# Patient Record
Sex: MALE | Race: WHITE | NOT HISPANIC OR LATINO | Employment: FULL TIME | URBAN - METROPOLITAN AREA
[De-identification: names, ages, dates, MRNs, and addresses within clinical notes are randomized per-mention and may not be internally consistent; named-entity substitution may affect disease eponyms.]

---

## 2023-08-30 PROBLEM — I27.20 PULMONARY HYPERTENSION (MULTI): Status: ACTIVE | Noted: 2023-08-30

## 2023-08-30 PROBLEM — E78.00 HYPERCHOLESTEROLEMIA: Status: ACTIVE | Noted: 2023-08-30

## 2023-08-30 PROBLEM — I25.10 CORONARY ARTERY DISEASE INVOLVING NATIVE CORONARY ARTERY OF NATIVE HEART WITHOUT ANGINA PECTORIS: Status: ACTIVE | Noted: 2023-08-30

## 2023-08-30 PROBLEM — Z95.5 PRESENCE OF STENT IN CORONARY ARTERY: Status: ACTIVE | Noted: 2023-08-30

## 2023-08-30 PROBLEM — I10 BENIGN ESSENTIAL HYPERTENSION: Status: ACTIVE | Noted: 2023-08-30

## 2023-08-30 PROBLEM — I25.2 OLD MI (MYOCARDIAL INFARCTION): Status: ACTIVE | Noted: 2023-08-30

## 2023-08-30 RX ORDER — ATORVASTATIN CALCIUM 80 MG/1
1 TABLET, FILM COATED ORAL NIGHTLY
COMMUNITY
Start: 2018-11-30 | End: 2023-10-06 | Stop reason: SDUPTHER

## 2023-08-30 RX ORDER — CARVEDILOL 6.25 MG/1
1 TABLET ORAL 2 TIMES DAILY
COMMUNITY
Start: 2018-11-16 | End: 2023-10-06 | Stop reason: SDUPTHER

## 2023-08-30 RX ORDER — NITROGLYCERIN 0.4 MG/1
0.4 TABLET SUBLINGUAL EVERY 5 MIN PRN
COMMUNITY

## 2023-08-30 RX ORDER — LOSARTAN POTASSIUM 100 MG/1
100 TABLET ORAL DAILY
COMMUNITY
End: 2023-10-06 | Stop reason: SDUPTHER

## 2023-08-30 RX ORDER — LOSARTAN POTASSIUM 50 MG/1
1 TABLET ORAL DAILY
COMMUNITY
Start: 2021-09-21 | End: 2023-10-06 | Stop reason: DRUGHIGH

## 2023-08-30 RX ORDER — CLOPIDOGREL BISULFATE 75 MG/1
1 TABLET ORAL DAILY
COMMUNITY
Start: 2018-08-24 | End: 2023-10-06 | Stop reason: SDUPTHER

## 2023-10-05 NOTE — ASSESSMENT & PLAN NOTE
[facility] (07/17-07/20/2018): Presented inferior ST elevation MI. Aspiration thrombectomy & YANELI circumflex artery. Significant thrombus burden. Slow flow 2nd obtuse marginal artery.

## 2023-10-06 ENCOUNTER — OFFICE VISIT (OUTPATIENT)
Dept: CARDIOLOGY | Facility: CLINIC | Age: 63
End: 2023-10-06
Payer: COMMERCIAL

## 2023-10-06 VITALS
HEART RATE: 55 BPM | BODY MASS INDEX: 28.7 KG/M2 | OXYGEN SATURATION: 95 % | SYSTOLIC BLOOD PRESSURE: 148 MMHG | RESPIRATION RATE: 16 BRPM | WEIGHT: 200 LBS | DIASTOLIC BLOOD PRESSURE: 88 MMHG

## 2023-10-06 DIAGNOSIS — E78.00 HYPERCHOLESTEROLEMIA: ICD-10-CM

## 2023-10-06 DIAGNOSIS — I25.10 CORONARY ARTERY DISEASE INVOLVING NATIVE CORONARY ARTERY OF NATIVE HEART WITHOUT ANGINA PECTORIS: ICD-10-CM

## 2023-10-06 DIAGNOSIS — I10 BENIGN ESSENTIAL HYPERTENSION: Primary | ICD-10-CM

## 2023-10-06 DIAGNOSIS — I10 BENIGN ESSENTIAL HYPERTENSION: ICD-10-CM

## 2023-10-06 DIAGNOSIS — Z95.5 PRESENCE OF STENT IN CORONARY ARTERY: ICD-10-CM

## 2023-10-06 DIAGNOSIS — I25.2 OLD MI (MYOCARDIAL INFARCTION): ICD-10-CM

## 2023-10-06 PROCEDURE — 3077F SYST BP >= 140 MM HG: CPT | Performed by: INTERNAL MEDICINE

## 2023-10-06 PROCEDURE — 3079F DIAST BP 80-89 MM HG: CPT | Performed by: INTERNAL MEDICINE

## 2023-10-06 PROCEDURE — 99214 OFFICE O/P EST MOD 30 MIN: CPT | Performed by: INTERNAL MEDICINE

## 2023-10-06 RX ORDER — CARVEDILOL 6.25 MG/1
6.25 TABLET ORAL 2 TIMES DAILY
Qty: 180 TABLET | Refills: 3 | Status: SHIPPED | OUTPATIENT
Start: 2023-10-06

## 2023-10-06 RX ORDER — CLOPIDOGREL BISULFATE 75 MG/1
75 TABLET ORAL DAILY
Qty: 90 TABLET | Refills: 3 | Status: SHIPPED | OUTPATIENT
Start: 2023-10-06

## 2023-10-06 RX ORDER — LOSARTAN POTASSIUM 100 MG/1
100 TABLET ORAL DAILY
Qty: 90 TABLET | Refills: 3 | Status: SHIPPED | OUTPATIENT
Start: 2023-10-06

## 2023-10-06 RX ORDER — ATORVASTATIN CALCIUM 80 MG/1
80 TABLET, FILM COATED ORAL NIGHTLY
Qty: 90 TABLET | Refills: 3 | Status: SHIPPED | OUTPATIENT
Start: 2023-10-06

## 2023-10-06 NOTE — PROGRESS NOTES
Subjective   Patient ID: Leander Ceja is a 62 y.o. male who presents for Follow-up.  The patient is a 62-year-old male with a history of cigarette smoking and a family history of premature CAD who initially presented with chest discomfort, diaphoresis and nausea. He was found to have an inferior STEMI. He was emergently taken to cardiac catheterization where he had a thrombotic occlusion of the circumflex artery. He underwent primary PCI with YANELI using 3.5 mm stents, post dilated with a 4.0 NC balloon. He had ISELA 2 flow down the circumflex artery. He was placed on Integrilin and returned to the cath lab 2 days later for a relook. This demonstrated patent stents with a 95% stenosis in the distal branch of the obtuse marginal artery. He had moderate LAD disease. He had mild RCA disease. He was medically managed and follows up today.      Patient's ejection fraction by echocardiogram July 18, 2018 was 55-60%.     He denies any chest discomfort, shortness of breath, palpitations, lightheadedness, syncope, orthopnea, PND.  He does admit to a couple episodes of dependent lower extremity edema that resolves when he sits down.        Review of Systems   All other systems reviewed and are negative.      Objective   Physical Exam  Vitals and nursing note reviewed.   Constitutional:       Appearance: Normal appearance.   HENT:      Head: Normocephalic and atraumatic.      Mouth/Throat:      Mouth: Mucous membranes are moist.      Pharynx: Oropharynx is clear.   Cardiovascular:      Rate and Rhythm: Normal rate and regular rhythm.      Pulses: Normal pulses.      Heart sounds: Normal heart sounds.   Pulmonary:      Effort: Pulmonary effort is normal.      Breath sounds: Normal breath sounds.   Abdominal:      General: Bowel sounds are normal.      Palpations: Abdomen is soft.   Musculoskeletal:      Cervical back: Neck supple.   Skin:     General: Skin is warm and dry.   Neurological:      General: No focal deficit present.       Mental Status: He is alert.   Psychiatric:         Mood and Affect: Mood normal.         Behavior: Behavior normal.         Assessment/Plan   Problem List Items Addressed This Visit             ICD-10-CM       Cardiac and Vasculature    Benign essential hypertension - Primary I10    Coronary artery disease involving native coronary artery of native heart without angina pectoris I25.10    Relevant Orders    Lipid Panel    Hypercholesterolemia E78.00    Old MI (myocardial infarction) I25.2     [facility]- (07/17-07/20/2018): Presented inferior ST elevation MI. Aspiration thrombectomy & YANELI circumflex artery. Significant thrombus burden. Slow flow 2nd obtuse marginal artery.         Presence of stent in coronary artery Z95.5     Patient is currently asymptomatic from a cardiac standpoint.     Patient will otherwise stay on carvedilol 6.25 mg twice daily and losartan 100 mg daily.      Patient will stay on atorvastatin and clopidogrel.     We will check a lipid panel.  His goal LDL is less than 70.     Patient will follow up with me in 1 year or sooner if he has more problems.

## 2023-10-23 ENCOUNTER — LAB (OUTPATIENT)
Dept: LAB | Facility: LAB | Age: 63
End: 2023-10-23
Payer: COMMERCIAL

## 2023-10-23 DIAGNOSIS — I25.10 CORONARY ARTERY DISEASE INVOLVING NATIVE CORONARY ARTERY OF NATIVE HEART WITHOUT ANGINA PECTORIS: ICD-10-CM

## 2023-10-23 LAB
CHOLEST SERPL-MCNC: 106 MG/DL (ref 0–199)
CHOLESTEROL/HDL RATIO: 2.7
HDLC SERPL-MCNC: 38.9 MG/DL
LDLC SERPL CALC-MCNC: 44 MG/DL
NON HDL CHOLESTEROL: 67 MG/DL (ref 0–149)
TRIGL SERPL-MCNC: 114 MG/DL (ref 0–149)
VLDL: 23 MG/DL (ref 0–40)

## 2023-10-23 PROCEDURE — 36415 COLL VENOUS BLD VENIPUNCTURE: CPT

## 2023-10-23 PROCEDURE — 80061 LIPID PANEL: CPT

## 2024-06-20 DIAGNOSIS — I10 BENIGN ESSENTIAL HYPERTENSION: ICD-10-CM

## 2024-06-20 DIAGNOSIS — I25.10 CORONARY ARTERY DISEASE INVOLVING NATIVE CORONARY ARTERY OF NATIVE HEART WITHOUT ANGINA PECTORIS: ICD-10-CM

## 2024-06-20 DIAGNOSIS — E78.00 HYPERCHOLESTEROLEMIA: ICD-10-CM

## 2024-06-20 RX ORDER — CARVEDILOL 6.25 MG/1
6.25 TABLET ORAL 2 TIMES DAILY
Qty: 180 TABLET | Refills: 3 | Status: SHIPPED | OUTPATIENT
Start: 2024-06-20

## 2024-06-20 RX ORDER — CLOPIDOGREL BISULFATE 75 MG/1
75 TABLET ORAL DAILY
Qty: 90 TABLET | Refills: 3 | Status: SHIPPED | OUTPATIENT
Start: 2024-06-20

## 2024-06-20 RX ORDER — LOSARTAN POTASSIUM 100 MG/1
100 TABLET ORAL DAILY
Qty: 90 TABLET | Refills: 3 | Status: SHIPPED | OUTPATIENT
Start: 2024-06-20

## 2024-06-20 RX ORDER — ATORVASTATIN CALCIUM 80 MG/1
80 TABLET, FILM COATED ORAL NIGHTLY
Qty: 90 TABLET | Refills: 3 | Status: SHIPPED | OUTPATIENT
Start: 2024-06-20

## 2024-07-30 DIAGNOSIS — E78.00 HYPERCHOLESTEROLEMIA: ICD-10-CM

## 2024-07-30 DIAGNOSIS — I25.10 CORONARY ARTERY DISEASE INVOLVING NATIVE CORONARY ARTERY OF NATIVE HEART WITHOUT ANGINA PECTORIS: ICD-10-CM

## 2024-07-30 DIAGNOSIS — I10 BENIGN ESSENTIAL HYPERTENSION: ICD-10-CM

## 2024-07-30 RX ORDER — LOSARTAN POTASSIUM 100 MG/1
100 TABLET ORAL DAILY
Qty: 90 TABLET | Refills: 3 | Status: SHIPPED | OUTPATIENT
Start: 2024-07-30 | End: 2025-07-30

## 2024-07-30 RX ORDER — CARVEDILOL 6.25 MG/1
6.25 TABLET ORAL 2 TIMES DAILY
Qty: 180 TABLET | Refills: 3 | Status: SHIPPED | OUTPATIENT
Start: 2024-07-30 | End: 2025-07-30

## 2024-07-30 RX ORDER — ATORVASTATIN CALCIUM 80 MG/1
80 TABLET, FILM COATED ORAL NIGHTLY
Qty: 90 TABLET | Refills: 3 | Status: SHIPPED | OUTPATIENT
Start: 2024-07-30 | End: 2025-07-30

## 2024-07-30 RX ORDER — CLOPIDOGREL BISULFATE 75 MG/1
75 TABLET ORAL DAILY
Qty: 90 TABLET | Refills: 3 | Status: SHIPPED | OUTPATIENT
Start: 2024-07-30 | End: 2025-07-30

## 2024-10-11 ENCOUNTER — APPOINTMENT (OUTPATIENT)
Dept: CARDIOLOGY | Facility: CLINIC | Age: 64
End: 2024-10-11
Payer: COMMERCIAL

## 2024-11-12 ENCOUNTER — APPOINTMENT (OUTPATIENT)
Dept: CARDIOLOGY | Facility: CLINIC | Age: 64
End: 2024-11-12
Payer: COMMERCIAL

## 2024-11-12 VITALS
BODY MASS INDEX: 31.04 KG/M2 | SYSTOLIC BLOOD PRESSURE: 180 MMHG | HEART RATE: 53 BPM | WEIGHT: 216.8 LBS | OXYGEN SATURATION: 98 % | HEIGHT: 70 IN | DIASTOLIC BLOOD PRESSURE: 90 MMHG

## 2024-11-12 DIAGNOSIS — I25.2 OLD MI (MYOCARDIAL INFARCTION): ICD-10-CM

## 2024-11-12 DIAGNOSIS — I10 BENIGN ESSENTIAL HYPERTENSION: ICD-10-CM

## 2024-11-12 DIAGNOSIS — Z95.5 PRESENCE OF STENT IN CORONARY ARTERY: ICD-10-CM

## 2024-11-12 DIAGNOSIS — I25.10 CORONARY ARTERY DISEASE INVOLVING NATIVE CORONARY ARTERY OF NATIVE HEART WITHOUT ANGINA PECTORIS: ICD-10-CM

## 2024-11-12 PROCEDURE — 3008F BODY MASS INDEX DOCD: CPT | Performed by: INTERNAL MEDICINE

## 2024-11-12 PROCEDURE — 3077F SYST BP >= 140 MM HG: CPT | Performed by: INTERNAL MEDICINE

## 2024-11-12 PROCEDURE — 93000 ELECTROCARDIOGRAM COMPLETE: CPT | Performed by: INTERNAL MEDICINE

## 2024-11-12 PROCEDURE — 99214 OFFICE O/P EST MOD 30 MIN: CPT | Performed by: INTERNAL MEDICINE

## 2024-11-12 PROCEDURE — 3080F DIAST BP >= 90 MM HG: CPT | Performed by: INTERNAL MEDICINE

## 2024-11-12 RX ORDER — AMLODIPINE BESYLATE 5 MG/1
5 TABLET ORAL DAILY
Qty: 90 TABLET | Refills: 3 | Status: SHIPPED | OUTPATIENT
Start: 2024-11-12 | End: 2025-11-12

## 2024-11-12 NOTE — LETTER
November 12, 2024     No Recipients    Patient: Leander Ceja   YOB: 1960   Date of Visit: 11/12/2024       Dear Dr. Chance Recipients:    Thank you for referring Leander Ceja to me for evaluation. Below are my notes for this consultation.  If you have questions, please do not hesitate to call me. I look forward to following your patient along with you.       Sincerely,     Zak Jules MD      CC: No Recipients  ______________________________________________________________________________________        [facility] Cardiology Outpatient Follow-up Visit     Reason for Visit: CAD    HPI: Leander Ceja is a 64 y.o.  male who presents today for followup.     The patient is a 64-year-old male with a history of cigarette smoking and a family history of premature CAD who initially presented with chest discomfort, diaphoresis and nausea. He was found to have an inferior STEMI. He was emergently taken to cardiac catheterization where he had a thrombotic occlusion of the circumflex artery. He underwent primary PCI with YANELI using 3.5 mm stents, post dilated with a 4.0 NC balloon. He had ISELA 2 flow down the circumflex artery. He was placed on Integrilin and returned to the cath lab 2 days later for a relook. This demonstrated patent stents with a 95% stenosis in the distal branch of the obtuse marginal artery. He had moderate LAD disease. He had mild RCA disease. He was medically managed and follows up today.     He denies any chest discomfort, shortness of breath, lightheadedness, syncope, orthopnea, PND.  Denies any lower extremity edema.  He admits to an occasional palpitation.     Patient's ejection fraction by echocardiogram July 18, 2018 was 55-60%.  10/23/2023 , LDL 44, HDL 39, triglycerides 114.  11/12/2024 ECG: Sinus bradycardia, PAC, right bundle branch block.    Past Medical History:   He has a past medical history of Personal history of other diseases of the circulatory system (08/24/2018)  "and Personal history of other medical treatment.    Surgical History:   He has a past surgical history that includes Coronary angioplasty with stent (08/24/2018).    Family History:   No family history on file.    Allergies:  Patient has no known allergies.     Social History:   Current smoker    Prior Cardiovascular Testing (Personally Reviewed):     Review of Systems:  Review of Systems   All other systems reviewed and are negative.      Outpatient Medications:    Current Outpatient Medications:   •  atorvastatin (Lipitor) 80 mg tablet, Take 1 tablet (80 mg) by mouth once daily at bedtime., Disp: 90 tablet, Rfl: 3  •  carvedilol (Coreg) 6.25 mg tablet, Take 1 tablet (6.25 mg) by mouth 2 times a day., Disp: 180 tablet, Rfl: 3  •  clopidogrel (Plavix) 75 mg tablet, Take 1 tablet (75 mg) by mouth once daily., Disp: 90 tablet, Rfl: 3  •  losartan (Cozaar) 100 mg tablet, Take 1 tablet (100 mg) by mouth once daily., Disp: 90 tablet, Rfl: 3  •  nitroglycerin (Nitrostat) 0.4 mg SL tablet, Place 1 tablet (0.4 mg) under the tongue every 5 minutes if needed. UP TO 3 DOSES AS NEEDED FOR CHEST PAIN., Disp: , Rfl:      Last Recorded Vitals  /90 (BP Location: Left arm, Patient Position: Sitting, BP Cuff Size: Adult)   Pulse 53   Ht 1.778 m (5' 10\")   Wt 98.3 kg (216 lb 12.8 oz)   SpO2 98%   BMI 31.11 kg/m²     Physical Exam:    Physical Exam  Vitals reviewed.   Constitutional:       Appearance: Normal appearance.   HENT:      Head: Normocephalic and atraumatic.      Mouth/Throat:      Mouth: Mucous membranes are moist.      Pharynx: Oropharynx is clear.   Eyes:      Extraocular Movements: Extraocular movements intact.      Conjunctiva/sclera: Conjunctivae normal.   Cardiovascular:      Rate and Rhythm: Normal rate and regular rhythm.      Pulses: Normal pulses.      Heart sounds: Normal heart sounds.   Pulmonary:      Effort: Pulmonary effort is normal.      Breath sounds: Normal breath sounds.   Abdominal:      " "General: Bowel sounds are normal.      Palpations: Abdomen is soft.   Musculoskeletal:         General: No swelling.      Cervical back: Neck supple.   Skin:     General: Skin is warm and dry.   Neurological:      General: No focal deficit present.      Mental Status: He is alert.   Psychiatric:         Mood and Affect: Mood normal.         Behavior: Behavior normal.         Lab/Radiology/Diagnostic Review:    Labs    Lab Results   Component Value Date    GLUCOSE 93 09/28/2021    CALCIUM 8.7 09/28/2021     09/28/2021    K 4.2 09/28/2021    CO2 29 09/28/2021     09/28/2021    BUN 14 09/28/2021    CREATININE 1.03 09/28/2021       No results found for: \"WBC\", \"HGB\", \"HCT\", \"MCV\", \"PLT\"    Lab Results   Component Value Date    CHOL 106 10/23/2023     Lab Results   Component Value Date    HDL 38.9 10/23/2023     Lab Results   Component Value Date    LDLCALC 44 10/23/2023     Lab Results   Component Value Date    TRIG 114 10/23/2023     No components found for: \"CHOLHDL\"    No results found for: \"BNP\"    No results found for: \"TSH\"    Assessment:   Patient is currently asymptomatic from a cardiac standpoint.     Patient will otherwise stay on carvedilol 6.25 mg twice daily and losartan 100 mg daily.  Given his hypertension, we will go ahead and start amlodipine 5 mg daily.     Patient will stay on atorvastatin and clopidogrel.     His last LDL was therapeutic on high intensity statin.    Patient will follow-up with us in 6 months or sooner if he has more problems.    Zak Jules MD    "

## 2024-11-12 NOTE — PROGRESS NOTES
[facility] Cardiology Outpatient Follow-up Visit     Reason for Visit: CAD    HPI: Leander Ceja is a 64 y.o.  male who presents today for followup.     The patient is a 64-year-old male with a history of cigarette smoking and a family history of premature CAD who initially presented with chest discomfort, diaphoresis and nausea. He was found to have an inferior STEMI. He was emergently taken to cardiac catheterization where he had a thrombotic occlusion of the circumflex artery. He underwent primary PCI with YANELI using 3.5 mm stents, post dilated with a 4.0 NC balloon. He had ISELA 2 flow down the circumflex artery. He was placed on Integrilin and returned to the cath lab 2 days later for a relook. This demonstrated patent stents with a 95% stenosis in the distal branch of the obtuse marginal artery. He had moderate LAD disease. He had mild RCA disease. He was medically managed and follows up today.     He denies any chest discomfort, shortness of breath, lightheadedness, syncope, orthopnea, PND.  Denies any lower extremity edema.  He admits to an occasional palpitation.     Patient's ejection fraction by echocardiogram July 18, 2018 was 55-60%.  10/23/2023 , LDL 44, HDL 39, triglycerides 114.  11/12/2024 ECG: Sinus bradycardia, PAC, right bundle branch block.    Past Medical History:   He has a past medical history of Personal history of other diseases of the circulatory system (08/24/2018) and Personal history of other medical treatment.    Surgical History:   He has a past surgical history that includes Coronary angioplasty with stent (08/24/2018).    Family History:   No family history on file.    Allergies:  Patient has no known allergies.     Social History:   Current smoker    Prior Cardiovascular Testing (Personally Reviewed):     Review of Systems:  Review of Systems   All other systems reviewed and are negative.      Outpatient Medications:    Current Outpatient Medications:     atorvastatin  "(Lipitor) 80 mg tablet, Take 1 tablet (80 mg) by mouth once daily at bedtime., Disp: 90 tablet, Rfl: 3    carvedilol (Coreg) 6.25 mg tablet, Take 1 tablet (6.25 mg) by mouth 2 times a day., Disp: 180 tablet, Rfl: 3    clopidogrel (Plavix) 75 mg tablet, Take 1 tablet (75 mg) by mouth once daily., Disp: 90 tablet, Rfl: 3    losartan (Cozaar) 100 mg tablet, Take 1 tablet (100 mg) by mouth once daily., Disp: 90 tablet, Rfl: 3    nitroglycerin (Nitrostat) 0.4 mg SL tablet, Place 1 tablet (0.4 mg) under the tongue every 5 minutes if needed. UP TO 3 DOSES AS NEEDED FOR CHEST PAIN., Disp: , Rfl:      Last Recorded Vitals  /90 (BP Location: Left arm, Patient Position: Sitting, BP Cuff Size: Adult)   Pulse 53   Ht 1.778 m (5' 10\")   Wt 98.3 kg (216 lb 12.8 oz)   SpO2 98%   BMI 31.11 kg/m²     Physical Exam:    Physical Exam  Vitals reviewed.   Constitutional:       Appearance: Normal appearance.   HENT:      Head: Normocephalic and atraumatic.      Mouth/Throat:      Mouth: Mucous membranes are moist.      Pharynx: Oropharynx is clear.   Eyes:      Extraocular Movements: Extraocular movements intact.      Conjunctiva/sclera: Conjunctivae normal.   Cardiovascular:      Rate and Rhythm: Normal rate and regular rhythm.      Pulses: Normal pulses.      Heart sounds: Normal heart sounds.   Pulmonary:      Effort: Pulmonary effort is normal.      Breath sounds: Normal breath sounds.   Abdominal:      General: Bowel sounds are normal.      Palpations: Abdomen is soft.   Musculoskeletal:         General: No swelling.      Cervical back: Neck supple.   Skin:     General: Skin is warm and dry.   Neurological:      General: No focal deficit present.      Mental Status: He is alert.   Psychiatric:         Mood and Affect: Mood normal.         Behavior: Behavior normal.         Lab/Radiology/Diagnostic Review:    Labs    Lab Results   Component Value Date    GLUCOSE 93 09/28/2021    CALCIUM 8.7 09/28/2021     09/28/2021 " "   K 4.2 09/28/2021    CO2 29 09/28/2021     09/28/2021    BUN 14 09/28/2021    CREATININE 1.03 09/28/2021       No results found for: \"WBC\", \"HGB\", \"HCT\", \"MCV\", \"PLT\"    Lab Results   Component Value Date    CHOL 106 10/23/2023     Lab Results   Component Value Date    HDL 38.9 10/23/2023     Lab Results   Component Value Date    LDLCALC 44 10/23/2023     Lab Results   Component Value Date    TRIG 114 10/23/2023     No components found for: \"CHOLHDL\"    No results found for: \"BNP\"    No results found for: \"TSH\"    Assessment:   Patient is currently asymptomatic from a cardiac standpoint.     Patient will otherwise stay on carvedilol 6.25 mg twice daily and losartan 100 mg daily.  Given his hypertension, we will go ahead and start amlodipine 5 mg daily.     Patient will stay on atorvastatin and clopidogrel.     His last LDL was therapeutic on high intensity statin.    Patient will follow-up with us in 6 months or sooner if he has more problems.    Zak Jules MD      "

## 2025-04-30 PROBLEM — E66.811 CLASS 1 OBESITY WITH BODY MASS INDEX (BMI) OF 31.0 TO 31.9 IN ADULT: Status: ACTIVE | Noted: 2025-04-30

## 2025-05-16 ENCOUNTER — OFFICE VISIT (OUTPATIENT)
Dept: CARDIOLOGY | Facility: CLINIC | Age: 65
End: 2025-05-16
Payer: COMMERCIAL

## 2025-05-16 VITALS
DIASTOLIC BLOOD PRESSURE: 100 MMHG | WEIGHT: 221 LBS | HEART RATE: 56 BPM | OXYGEN SATURATION: 96 % | BODY MASS INDEX: 31.71 KG/M2 | SYSTOLIC BLOOD PRESSURE: 188 MMHG

## 2025-05-16 DIAGNOSIS — I25.10 CORONARY ARTERY DISEASE INVOLVING NATIVE CORONARY ARTERY OF NATIVE HEART WITHOUT ANGINA PECTORIS: ICD-10-CM

## 2025-05-16 DIAGNOSIS — E66.811 CLASS 1 OBESITY WITH BODY MASS INDEX (BMI) OF 31.0 TO 31.9 IN ADULT, UNSPECIFIED OBESITY TYPE, UNSPECIFIED WHETHER SERIOUS COMORBIDITY PRESENT: ICD-10-CM

## 2025-05-16 DIAGNOSIS — E78.00 HYPERCHOLESTEROLEMIA: ICD-10-CM

## 2025-05-16 DIAGNOSIS — Z72.0 TOBACCO USE: ICD-10-CM

## 2025-05-16 DIAGNOSIS — I10 BENIGN ESSENTIAL HYPERTENSION: Primary | ICD-10-CM

## 2025-05-16 DIAGNOSIS — R29.818 SUSPECTED SLEEP APNEA: ICD-10-CM

## 2025-05-16 PROCEDURE — 3079F DIAST BP 80-89 MM HG: CPT | Performed by: INTERNAL MEDICINE

## 2025-05-16 PROCEDURE — 99202 OFFICE O/P NEW SF 15 MIN: CPT | Performed by: INTERNAL MEDICINE

## 2025-05-16 PROCEDURE — 3077F SYST BP >= 140 MM HG: CPT | Performed by: INTERNAL MEDICINE

## 2025-05-16 PROCEDURE — 99215 OFFICE O/P EST HI 40 MIN: CPT | Performed by: INTERNAL MEDICINE

## 2025-05-16 RX ORDER — AMLODIPINE BESYLATE 10 MG/1
10 TABLET ORAL DAILY
Qty: 90 TABLET | Refills: 3 | Status: SHIPPED | OUTPATIENT
Start: 2025-05-16 | End: 2026-05-16

## 2025-05-16 RX ORDER — HYDROCHLOROTHIAZIDE 25 MG/1
25 TABLET ORAL DAILY
Qty: 90 TABLET | Refills: 3 | Status: SHIPPED | OUTPATIENT
Start: 2025-05-16 | End: 2026-05-16

## 2025-05-16 NOTE — PATIENT INSTRUCTIONS
Weight loss  Stop smoking-call [phone] QUIT NOW  Lipid panel  BMP 1 week  Sleep study -snores/fatigue  Increase amlodipine to 10 mg daily for BP  Start hydrochlorothiazide in mornings for BP  Monitor home BP

## 2025-05-16 NOTE — PROGRESS NOTES
Chief Complaint:   Annual Exam, Hypertension, and Coronary Artery Disease     History Of Present Illness:    Leander Ceja is a 64 y.o. male presenting with CAD and hypertension.  Last saw  11/2024  Falls asleep easily sitting in chair  Some palpitations when anxious  Patient denies chest pain/SOB/dizziness/lightheadedness/edema/claudication  No regular exercise but walks    Last Recorded Vitals:  Vitals:    05/16/25 0925 05/16/25 0943   BP: 180/88 (!) 188/100   BP Location: Left arm    Patient Position: Sitting    BP Cuff Size: Large adult long    Pulse: 56    SpO2: 96%    Weight: 100 kg (221 lb)             Allergies:  Patient has no known allergies.    Outpatient Medications:  Current Outpatient Medications   Medication Instructions    amLODIPine (NORVASC) 5 mg, oral, Daily    atorvastatin (LIPITOR) 80 mg, oral, Nightly    carvedilol (COREG) 6.25 mg, oral, 2 times daily    clopidogrel (PLAVIX) 75 mg, oral, Daily    losartan (COZAAR) 100 mg, oral, Daily    nitroglycerin (NITROSTAT) 0.4 mg, Every 5 min PRN       Physical Exam:  Constitutional:       Appearance: Healthy appearance. Not in distress. Obese.   Neck:      Vascular: No JVR. JVD normal.   Pulmonary:      Effort: Pulmonary effort is normal.      Breath sounds: Normal breath sounds. No wheezing. No rhonchi. No rales.   Chest:      Chest wall: Not tender to palpatation.   Cardiovascular:      PMI at left midclavicular line. Normal rate. Regular rhythm. Normal S1. Normal S2.       Murmurs: There is no murmur.      No gallop.  No click. No rub.   Pulses:     Intact distal pulses.   Edema:     Peripheral edema absent.   Abdominal:      General: Bowel sounds are normal.      Palpations: Abdomen is soft.      Tenderness: There is no abdominal tenderness.   Musculoskeletal: Normal range of motion.         General: No tenderness. Skin:     General: Skin is warm and dry.   Neurological:      General: No focal deficit present.      Mental Status: Alert  "and oriented to person, place and time.          Last Labs:  CBC -  No results found for: \"WBC\", \"HGB\", \"HCT\", \"MCV\", \"PLT\"    CMP -  Lab Results   Component Value Date    CALCIUM 8.7 09/28/2021       LIPID PANEL -   Lab Results   Component Value Date    CHOL 106 10/23/2023    TRIG 114 10/23/2023    HDL 38.9 10/23/2023    CHHDL 2.7 10/23/2023    VLDL 23 10/23/2023        Lab Results   Component Value Date    LDLCALC 44 10/23/2023           RENAL FUNCTION PANEL -   Lab Results   Component Value Date    GLUCOSE 93 09/28/2021     09/28/2021    K 4.2 09/28/2021     09/28/2021    CO2 29 09/28/2021    ANIONGAP 11 09/28/2021    BUN 14 09/28/2021    CREATININE 1.03 09/28/2021    CALCIUM 8.7 09/28/2021        No results found for: \"BNP\", \"HGBA1C\"              Lab review: I have personally reviewed the laboratory result(s)       Problem List Items Addressed This Visit       Benign essential hypertension - Primary    Overview   BP not optimal  Increase amlodipine and add hydrochlorothiazide  Check BMP 1 week  Also suspect sleep apnea-check study         Coronary artery disease involving native coronary artery of native heart without angina pectoris    Overview   Status post 7/2018 inferior ST elevation MI treated with drug-eluting stent to the circumflex artery.  He had moderate LAD disease and mild RCA disease at that time.  No current angina  On Plavix/beta blocker/HI statin  Follow         Hypercholesterolemia    Overview   On HI statin  Check lipids         Class 1 obesity with body mass index (BMI) of 31.0 to 31.9 in adult    Overview   Needs weight loss         Tobacco use    Overview   Discussed importance of stopping smoking  Call 1800 QUIT NOW        Weight loss  Stop smoking-call 1800 QUIT NOW  Lipid panel  BMP 1 week  Sleep study -snores/fatigue  Increase amlodipine to 10 mg daily for BP  Start hydrochlorothiazide in mornings for BP  Monitor home BP  John Waite, DO  "

## 2025-05-31 ENCOUNTER — PROCEDURE VISIT (OUTPATIENT)
Dept: SLEEP MEDICINE | Facility: HOSPITAL | Age: 65
End: 2025-05-31
Payer: COMMERCIAL

## 2025-05-31 DIAGNOSIS — R29.818 SUSPECTED SLEEP APNEA: ICD-10-CM

## 2025-05-31 PROCEDURE — 95806 SLEEP STUDY UNATT&RESP EFFT: CPT | Performed by: PSYCHIATRY & NEUROLOGY

## 2025-06-08 LAB
ANION GAP SERPL CALCULATED.4IONS-SCNC: 11 MMOL/L (CALC) (ref 7–17)
BUN SERPL-MCNC: 19 MG/DL (ref 7–25)
BUN/CREAT SERPL: ABNORMAL (CALC) (ref 6–22)
CALCIUM SERPL-MCNC: 9.3 MG/DL (ref 8.6–10.3)
CHLORIDE SERPL-SCNC: 98 MMOL/L (ref 98–110)
CHOLEST SERPL-MCNC: 107 MG/DL
CHOLEST/HDLC SERPL: 2.4 (CALC)
CO2 SERPL-SCNC: 32 MMOL/L (ref 20–32)
CREAT SERPL-MCNC: 1.12 MG/DL (ref 0.7–1.35)
EGFRCR SERPLBLD CKD-EPI 2021: 73 ML/MIN/1.73M2
GLUCOSE SERPL-MCNC: 122 MG/DL (ref 65–99)
HDLC SERPL-MCNC: 44 MG/DL
LDLC SERPL CALC-MCNC: 43 MG/DL (CALC)
NONHDLC SERPL-MCNC: 63 MG/DL (CALC)
POTASSIUM SERPL-SCNC: 4 MMOL/L (ref 3.5–5.3)
SODIUM SERPL-SCNC: 141 MMOL/L (ref 135–146)
TRIGL SERPL-MCNC: 113 MG/DL

## 2025-07-08 DIAGNOSIS — E78.00 HYPERCHOLESTEROLEMIA: ICD-10-CM

## 2025-07-08 DIAGNOSIS — I10 BENIGN ESSENTIAL HYPERTENSION: ICD-10-CM

## 2025-07-08 DIAGNOSIS — I25.10 CORONARY ARTERY DISEASE INVOLVING NATIVE CORONARY ARTERY OF NATIVE HEART WITHOUT ANGINA PECTORIS: ICD-10-CM

## 2025-07-08 RX ORDER — LOSARTAN POTASSIUM 100 MG/1
100 TABLET ORAL DAILY
Qty: 90 TABLET | Refills: 3 | Status: SHIPPED | OUTPATIENT
Start: 2025-07-08 | End: 2026-07-08

## 2025-07-08 RX ORDER — CARVEDILOL 6.25 MG/1
6.25 TABLET ORAL 2 TIMES DAILY
Qty: 180 TABLET | Refills: 3 | Status: SHIPPED | OUTPATIENT
Start: 2025-07-08 | End: 2026-07-08

## 2025-07-08 RX ORDER — ATORVASTATIN CALCIUM 80 MG/1
80 TABLET, FILM COATED ORAL NIGHTLY
Qty: 90 TABLET | Refills: 3 | Status: SHIPPED | OUTPATIENT
Start: 2025-07-08 | End: 2026-07-08

## 2025-07-08 RX ORDER — CLOPIDOGREL BISULFATE 75 MG/1
75 TABLET ORAL DAILY
Qty: 90 TABLET | Refills: 3 | Status: SHIPPED | OUTPATIENT
Start: 2025-07-08 | End: 2026-07-08

## 2025-08-26 ENCOUNTER — OFFICE VISIT (OUTPATIENT)
Dept: CARDIOLOGY | Facility: CLINIC | Age: 65
End: 2025-08-26
Payer: COMMERCIAL

## 2025-08-26 VITALS
WEIGHT: 222 LBS | SYSTOLIC BLOOD PRESSURE: 150 MMHG | HEART RATE: 85 BPM | DIASTOLIC BLOOD PRESSURE: 90 MMHG | BODY MASS INDEX: 31.85 KG/M2 | OXYGEN SATURATION: 98 %

## 2025-08-26 DIAGNOSIS — I10 BENIGN ESSENTIAL HYPERTENSION: ICD-10-CM

## 2025-08-26 DIAGNOSIS — G47.33 OBSTRUCTIVE SLEEP APNEA: ICD-10-CM

## 2025-08-26 DIAGNOSIS — Z72.0 TOBACCO USE: ICD-10-CM

## 2025-08-26 DIAGNOSIS — E66.811 CLASS 1 OBESITY WITH BODY MASS INDEX (BMI) OF 31.0 TO 31.9 IN ADULT, UNSPECIFIED OBESITY TYPE, UNSPECIFIED WHETHER SERIOUS COMORBIDITY PRESENT: ICD-10-CM

## 2025-08-26 DIAGNOSIS — E78.00 HYPERCHOLESTEROLEMIA: ICD-10-CM

## 2025-08-26 DIAGNOSIS — I25.10 CORONARY ARTERY DISEASE INVOLVING NATIVE CORONARY ARTERY OF NATIVE HEART WITHOUT ANGINA PECTORIS: Primary | ICD-10-CM

## 2025-08-26 PROCEDURE — 99212 OFFICE O/P EST SF 10 MIN: CPT

## 2025-08-26 PROCEDURE — 3077F SYST BP >= 140 MM HG: CPT | Performed by: INTERNAL MEDICINE

## 2025-08-26 PROCEDURE — 3078F DIAST BP <80 MM HG: CPT | Performed by: INTERNAL MEDICINE

## 2025-08-26 PROCEDURE — 99214 OFFICE O/P EST MOD 30 MIN: CPT | Performed by: INTERNAL MEDICINE
